# Patient Record
Sex: FEMALE | Race: AMERICAN INDIAN OR ALASKA NATIVE
[De-identification: names, ages, dates, MRNs, and addresses within clinical notes are randomized per-mention and may not be internally consistent; named-entity substitution may affect disease eponyms.]

---

## 2017-06-07 ENCOUNTER — HOSPITAL ENCOUNTER (EMERGENCY)
Dept: HOSPITAL 5 - ED | Age: 57
Discharge: HOME | End: 2017-06-07
Payer: SELF-PAY

## 2017-06-07 VITALS — SYSTOLIC BLOOD PRESSURE: 166 MMHG | DIASTOLIC BLOOD PRESSURE: 92 MMHG

## 2017-06-07 DIAGNOSIS — I10: ICD-10-CM

## 2017-06-07 DIAGNOSIS — M62.838: ICD-10-CM

## 2017-06-07 DIAGNOSIS — M19.012: Primary | ICD-10-CM

## 2017-06-07 DIAGNOSIS — E11.9: ICD-10-CM

## 2017-06-07 PROCEDURE — 93010 ELECTROCARDIOGRAM REPORT: CPT

## 2017-06-07 PROCEDURE — 93005 ELECTROCARDIOGRAM TRACING: CPT

## 2017-06-07 PROCEDURE — 99283 EMERGENCY DEPT VISIT LOW MDM: CPT

## 2017-06-07 NOTE — XRAY REPORT
FINAL REPORT



PROCEDURE:  XR SHOULDER 2 LT



TECHNIQUE:  LEFT shoulder radiographs including AP views in

internal and external rotation and abduction. CPT 90244







HISTORY:  shoulder pain, sweling 



COMPARISON:  No prior studies are available for comparison.



FINDINGS:  

Fracture (s) and/or Dislocation(s): None .



Joint space(s): Mild narrowing of the joint spaces. Slight spur

formation off the acromioclavicular joint.



Soft tissues: Normal .



Bone mineralization: Normal .



Foreign bodies: None .







IMPRESSION:  

No evidence of an acute fracture dislocation. Moderate arthritis.

## 2017-06-07 NOTE — EMERGENCY DEPARTMENT REPORT
Upper Extremity





- HPI


Chief Complaint: Extremity Injury, Upper


Stated Complaint: L SHOULDER/NECK PAIN


Time Seen by Provider: 06/07/17 07:58


Upper Extremity: Left Shoulder


Occurred When: >5 Days (1 week)


Mechanism: Unsure


Severity: severe


Symptoms: Yes Pain with Movement, Yes Swelling (left trapezius muscle swelling)

, No Deformity, No Limited Range of Movement, No Numbness, No Weakness, No 

Bruising/Ecchymosis, No Laceration or Abrasion





ED Review of Systems


ROS: 


Stated complaint: L SHOULDER/NECK PAIN


Other details as noted in HPI





Comment: All other systems reviewed and negative


Constitutional: no symptoms reported.  denies: chills, diaphoresis, fever, 

malaise, weakness


Respiratory: no symptoms reported.  denies: cough, orthopnea, shortness of 

breath, SOB with exertion, SOB at rest, stridor, wheezing


Cardiovascular: denies: chest pain, palpitations, dyspnea on exertion, orthopnea


Endocrine: no symptoms reported


Gastrointestinal: denies: abdominal pain, nausea, vomiting, diarrhea, 

constipation, hematemesis, melena


Genitourinary: denies: urgency, dysuria


Musculoskeletal: arthralgia (left trapezius muscle pain)


Neurological: denies: headache, weakness, numbness, paresthesias, confusion, 

abnormal gait, vertigo





ED Past Medical Hx





- Past Medical History


Previous Medical History?: Yes


Hx Hypertension: Yes


Hx Diabetes: Yes





- Surgical History


Additional Surgical History: cyst removal from neck





- Social History


Smoking Status: Never Smoker





- Medications


Home Medications: 


 Home Medications











 Medication  Instructions  Recorded  Confirmed  Last Taken  Type


 


Ibuprofen [Motrin 800 MG tab] 800 mg PO Q8HR PRN #30 tablet 06/07/17  Unknown Rx


 


Methocarbamol [Robaxin TAB] 750 mg PO Q8H PRN #15 tablet 06/07/17  Unknown Rx














Upper Extremity Exam





- Exam


General: 


Vital signs noted. No distress. Alert and acting appropriately.





Head and Torso: No HEENT Abnormality, No Neck Tenderness, No Chest/Lungs 

Abnormality, No Abdominal Tenderness, No Back Tenderness


Shoulder Exam: Yes Shoulder Tenderness (left shoulder pain with swelling of 

trapezius muscle), Yes Normal Range of Motion in Shoulder, No Clavicle 

Tenderness, No Shoulder Deformity, No AC Joint Tenderness


Arm Exam: No Arm/Humerus Tenderness, No Arm Deformity


Elbow: Yes Normal Range of Motion in Elbow, No Elbow Tenderness, No Elbow 

Deformity


Forearm: No Forearm Tenderness, No Forearm Deformity, No Pain with Pronation, 

No Pain with Supination


Wrist: No Wrist Tenderness, No Normal ROM in Wrist, No Wrist Deformity, No 

Snuffbox Tenderness, No Pain with Axial Thumb Compression


Hand: No Hand Tenderness, No Hand Deformity, No Digit Tenderness, No Normal ROM 

in Digit(s), No Digit(s) Deformity, No Tendon Dysfunction


CMS Exam: Yes Normal Distal Pulses, Yes Normal Capillary Refill, Yes Normal 

Distal Sensation, No Broken Skin





ED Course


 Vital Signs











  06/07/17





  04:26


 


Temperature 98.7 F


 


Pulse Rate 120 H


 


Respiratory 22





Rate 


 


Blood Pressure 161/88


 


O2 Sat by Pulse 97





Oximetry 














ED Medical Decision Making





- Radiology Data


Radiology results: report reviewed





- Medical Decision Making


Patient was no acute distress, patient tenderness with palpation of the left 

shoulder muscles, patient had full range of motion.  Patient no cervical spine 

tenderness with thoracic spine tenderness.  She had no neurological focal 

deficits.


Patient was discharged with prescription for Robaxin 750 mg every 8 hours as 

needed for muscle spasms, she was given a prescription for ibuprofen 800 mg 

every 8 hours as needed for pain.


Patient was given information follow-up with orthopedic specialist.








- Differential Diagnosis


muscle spasms, shoulder strain, osteoarthritis


Critical care attestation.: 


If time is entered above; I have spent that time in minutes in the direct care 

of this critically ill patient, excluding procedure time.








ED Disposition


Clinical Impression: 


 Muscle spasm





Osteoarthritis


Qualifiers:


 Osteoarthritis location: shoulder Osteoarthritis type: unspecified Laterality: 

left Qualified Code(s): M19.012 - Primary osteoarthritis, left shoulder





Disposition: DC-01 TO HOME OR SELFCARE


Is pt being admited?: No


Does the pt Need Aspirin: No


Condition: Good


Instructions:  Muscle Spasm (ED), Osteoarthritis (ED)


Additional Instructions: 


Take Robaxin 750 mg every 8 hours as needed for muscle spasms, take ibuprofen 

800 mg every 8 hours as needed for pain.  Follow-up with provided orthopedic 

specialist for any complications.


Prescriptions: 


Ibuprofen [Motrin 800 MG tab] 800 mg PO Q8HR PRN #30 tablet


 PRN Reason: Pain


Methocarbamol [Robaxin TAB] 750 mg PO Q8H PRN #15 tablet


 PRN Reason: Muscle Spasm


Referrals: 


PRIMARY CARE,MD [Primary Care Provider] - 3-5 Days


Forms:  Work/School Release Form(ED)


Time of Disposition: 08:20

## 2017-10-02 ENCOUNTER — HOSPITAL ENCOUNTER (EMERGENCY)
Dept: HOSPITAL 5 - ED | Age: 57
LOS: 1 days | Discharge: HOME | End: 2017-10-03
Payer: COMMERCIAL

## 2017-10-02 DIAGNOSIS — I10: ICD-10-CM

## 2017-10-02 DIAGNOSIS — L02.12: Primary | ICD-10-CM

## 2017-10-02 DIAGNOSIS — E11.9: ICD-10-CM

## 2017-10-02 PROCEDURE — 99282 EMERGENCY DEPT VISIT SF MDM: CPT

## 2017-10-02 NOTE — EMERGENCY DEPARTMENT REPORT
- General


Chief complaint: Skin/Abscess/Foreign Body


Stated complaint: CYST


Time Seen by Provider: 10/02/17 22:48


Source: patient


Mode of arrival: Ambulatory


Limitations: No Limitations





- History of Present Illness


Initial comments: 





This is a 57-year-old female nontoxic, well nourished in appearance, no acute 

signs of distress presents to the ED complaining of nodular cyst on the back of 

her neck times several days.  Patient stated she had been diagnosed with 

abscess last year had lanced and has recurred several days ago.  Patient denies 

any trauma to the region.  Denies stiff neck, headache, fever, chills, nausea, 

vomiting, cough, numbness, tingling, shortness of breath, abdominal pain.  

Patient denies any allergies. History includes diabetes and hypertension.


MD complaint: abscess/boil


-: Gradual, days(s) (6)


Tetanus Up to Date: yes (2016)


Location: neck


Severity: mild


Severity scale (0 -10): 7


Quality: aching


Consistency: constant


Improves with: none


Worsens with: none


Context: none


Associated symptoms: denies other symptoms


Treatments Prior to Arrival: none





- Related Data


 Previous Rx's











 Medication  Instructions  Recorded  Last Taken  Type


 


Ibuprofen [Motrin 800 MG tab] 800 mg PO Q8HR PRN #30 tablet 06/07/17 Unknown Rx


 


Methocarbamol [Robaxin TAB] 750 mg PO Q8H PRN #15 tablet 06/07/17 Unknown Rx


 


Sulfamethoxazole/Trimethoprim 1 each PO BID #14 tablet 10/02/17 Unknown Rx





[Bactrim DS TAB]    


 


traMADol [Ultram] 50 mg PO Q6HR PRN #12 tablet 10/02/17 Unknown Rx











 Allergies











Allergy/AdvReac Type Severity Reaction Status Date / Time


 


No Known Allergies Allergy   Unverified 05/06/16 12:34














Abscess Boil HPI





- HPI


Chief Complaint: Skin/Abscess/Foreign Body


Stated Complaint: CYST


Time Seen by Provider: 10/02/17 22:48


Home Medications: 


 Previous Rx's











 Medication  Instructions  Recorded  Last Taken  Type


 


Ibuprofen [Motrin 800 MG tab] 800 mg PO Q8HR PRN #30 tablet 06/07/17 Unknown Rx


 


Methocarbamol [Robaxin TAB] 750 mg PO Q8H PRN #15 tablet 06/07/17 Unknown Rx


 


Sulfamethoxazole/Trimethoprim 1 each PO BID #14 tablet 10/02/17 Unknown Rx





[Bactrim DS TAB]    


 


traMADol [Ultram] 50 mg PO Q6HR PRN #12 tablet 10/02/17 Unknown Rx











Allergies/Adverse Reactions: 


 Allergies











Allergy/AdvReac Type Severity Reaction Status Date / Time


 


No Known Allergies Allergy   Unverified 05/06/16 12:34














ED Review of Systems


ROS: 


Stated complaint: CYST


Other details as noted in HPI





Constitutional: denies: chills, fever


Eyes: denies: eye pain, eye discharge, vision change


ENT: denies: ear pain, throat pain


Respiratory: denies: cough, shortness of breath, wheezing


Cardiovascular: denies: chest pain, palpitations


Endocrine: no symptoms reported


Gastrointestinal: denies: abdominal pain, nausea, diarrhea


Genitourinary: denies: urgency, dysuria, discharge


Musculoskeletal: denies: back pain, joint swelling, arthralgia


Skin: denies: rash, lesions


Neurological: denies: headache, weakness, paresthesias


Psychiatric: denies: anxiety, depression


Hematological/Lymphatic: denies: easy bleeding, easy bruising





ED Past Medical Hx





- Past Medical History


Hx Hypertension: Yes


Hx Diabetes: Yes





- Surgical History


Additional Surgical History: cyst removal from neck





- Social History


Smoking Status: Never Smoker


Substance Use Type: None





- Medications


Home Medications: 


 Home Medications











 Medication  Instructions  Recorded  Confirmed  Last Taken  Type


 


Ibuprofen [Motrin 800 MG tab] 800 mg PO Q8HR PRN #30 tablet 06/07/17  Unknown Rx


 


Methocarbamol [Robaxin TAB] 750 mg PO Q8H PRN #15 tablet 06/07/17  Unknown Rx


 


Sulfamethoxazole/Trimethoprim 1 each PO BID #14 tablet 10/02/17  Unknown Rx





[Bactrim DS TAB]     


 


traMADol [Ultram] 50 mg PO Q6HR PRN #12 tablet 10/02/17  Unknown Rx














ED Physical Exam





- General


Limitations: No Limitations


General appearance: alert, in no apparent distress





- Head


Head exam: Present: atraumatic, normocephalic





- Eye


Eye exam: Present: normal appearance, PERRL, EOMI


Pupils: Present: normal accommodation





- ENT


ENT exam: Present: normal exam, normal orophraynx, mucous membranes moist, TM's 

normal bilaterally, normal external ear exam





- Neck


Neck exam: Present: normal inspection, tenderness, full ROM, other (3 cm 

nodular swelling to the posterior neck region. No induration or flutance noted. 

No pus or drianage noted.  No surrounding erythema or cellulitis as noted.).  

Absent: meningismus, lymphadenopathy, thyromegaly





- Respiratory


Respiratory exam: Present: normal lung sounds bilaterally.  Absent: respiratory 

distress, wheezes, rales, rhonchi, stridor, chest wall tenderness, accessory 

muscle use, decreased breath sounds, prolonged expiratory





- Cardiovascular


Cardiovascular Exam: Present: regular rate, normal rhythm, normal heart sounds.

  Absent: systolic murmur, diastolic murmur, rubs, gallop





- GI/Abdominal


GI/Abdominal exam: Present: soft, normal bowel sounds





- Extremities Exam


Extremities exam: Present: normal inspection, full ROM, normal capillary 

refill.  Absent: tenderness, pedal edema, joint swelling, calf tenderness





- Back Exam


Back exam: Present: normal inspection, full ROM.  Absent: tenderness, CVA 

tenderness (R), CVA tenderness (L), muscle spasm, paraspinal tenderness, 

vertebral tenderness, rash noted





- Neurological Exam


Neurological exam: Present: alert, oriented X3, CN II-XII intact, normal gait, 

reflexes normal





- Psychiatric


Psychiatric exam: Present: normal affect, normal mood





- Skin


Skin exam: Present: warm, dry, intact, normal color.  Absent: rash





ED Course


 Vital Signs











  10/02/17





  17:59


 


Temperature 98 F


 


Pulse Rate 100 H


 


Respiratory 20





Rate 


 


Blood Pressure 160/88


 


O2 Sat by Pulse 100





Oximetry 














- Reevaluation(s)


Reevaluation #1: 





10/02/17 23:28


Patient is speaking in full sentences with no signs of distress noted.





ED Medical Decision Making





- Medical Decision Making





57-year-old female that presents with a nodular boil to the posterior neck 

region.  There is no induration or fluctuance noted.  Clinical examination is 

not consistent of abscess.  I instructed patient that this may develop an 

abscess and that if it is getting worse after medical treatment of antibiotics 

she must return to the emergency room to get it lanced.  Patient denies any 

stiff neck or fever.  Patient received Bactrim at discharge. At time time of 

discharge, the patient does not seem toxic or ill in appearance.  No acute 

signs of distress noted.  Patient agrees to discharge treatment plan of care.  

No further questions noted by the patient. Patient was instructed to follow-up 

with a primary care doctor in 3-5 days or if symptoms worsen and continue 

return to emergency room as soon as possible possible.


Critical care attestation.: 


If time is entered above; I have spent that time in minutes in the direct care 

of this critically ill patient, excluding procedure time.








ED Disposition


Clinical Impression: 


 Boil





Disposition: DC-01 TO HOME OR SELFCARE


Is pt being admited?: No


Does the pt Need Aspirin: No


Condition: Stable


Instructions:  Abscess (ED), Sulfamethoxazole/Trimethoprim (By mouth), Tramadol 

(By mouth)


Additional Instructions: 


Patient was instructed to follow-up with a primary care doctor in 3-5 days or 

if symptoms worsen and continue return to emergency room as soon as possible 

possible.


Do not drive any machinery while taking Ultram due to drowsiness.


This may develop into an abscess and if it is getting worse and is increased 

swelling you must return to emergency room to have it lanced.


Prescriptions: 


Sulfamethoxazole/Trimethoprim [Bactrim DS TAB] 1 each PO BID #14 tablet


traMADol [Ultram] 50 mg PO Q6HR PRN #12 tablet


 PRN Reason: Pain


Referrals: 


PRIMARY MD MOSHE [Primary Care Provider] - 3-5 Days


TATA CARRINGTON MD [Staff Physician] - 3-5 Days


[facility] [Outside] - 3-5 Days


[facility] [Outside] - 3-5 Days


Forms:  Work/School Release Form(ED)

## 2017-10-03 VITALS — DIASTOLIC BLOOD PRESSURE: 81 MMHG | SYSTOLIC BLOOD PRESSURE: 175 MMHG

## 2018-01-15 ENCOUNTER — HOSPITAL ENCOUNTER (EMERGENCY)
Dept: HOSPITAL 5 - ED | Age: 58
LOS: 1 days | Discharge: HOME | End: 2018-01-16
Payer: COMMERCIAL

## 2018-01-15 DIAGNOSIS — I10: ICD-10-CM

## 2018-01-15 DIAGNOSIS — E11.9: ICD-10-CM

## 2018-01-15 DIAGNOSIS — M17.12: Primary | ICD-10-CM

## 2018-01-15 LAB
BASOPHILS # (AUTO): 0.1 K/MM3 (ref 0–0.1)
BASOPHILS NFR BLD AUTO: 1 % (ref 0–1.8)
BUN SERPL-MCNC: 12 MG/DL (ref 7–17)
BUN/CREAT SERPL: 24 %
CALCIUM SERPL-MCNC: 9.6 MG/DL (ref 8.4–10.2)
EOSINOPHIL # BLD AUTO: 0.2 K/MM3 (ref 0–0.4)
EOSINOPHIL NFR BLD AUTO: 2.7 % (ref 0–4.3)
HCT VFR BLD CALC: 29.1 % (ref 30.3–42.9)
HEMOLYSIS INDEX: 6
HGB BLD-MCNC: 8.8 GM/DL (ref 10.1–14.3)
LYMPHOCYTES # BLD AUTO: 2.7 K/MM3 (ref 1.2–5.4)
LYMPHOCYTES NFR BLD AUTO: 40.1 % (ref 13.4–35)
MCH RBC QN AUTO: 18 PG (ref 28–32)
MCHC RBC AUTO-ENTMCNC: 30 % (ref 30–34)
MCV RBC AUTO: 58 FL (ref 79–97)
MONOCYTES # (AUTO): 0.4 K/MM3 (ref 0–0.8)
MONOCYTES % (AUTO): 6.2 % (ref 0–7.3)
PLATELET # BLD: 564 K/MM3 (ref 140–440)
RBC # BLD AUTO: 4.99 M/MM3 (ref 3.65–5.03)

## 2018-01-15 PROCEDURE — 80048 BASIC METABOLIC PNL TOTAL CA: CPT

## 2018-01-15 PROCEDURE — 96374 THER/PROPH/DIAG INJ IV PUSH: CPT

## 2018-01-15 PROCEDURE — 81001 URINALYSIS AUTO W/SCOPE: CPT

## 2018-01-15 PROCEDURE — 96372 THER/PROPH/DIAG INJ SC/IM: CPT

## 2018-01-15 PROCEDURE — 36415 COLL VENOUS BLD VENIPUNCTURE: CPT

## 2018-01-15 PROCEDURE — 29505 APPLICATION LONG LEG SPLINT: CPT

## 2018-01-15 PROCEDURE — 73562 X-RAY EXAM OF KNEE 3: CPT

## 2018-01-15 PROCEDURE — 85025 COMPLETE CBC W/AUTO DIFF WBC: CPT

## 2018-01-15 PROCEDURE — 99284 EMERGENCY DEPT VISIT MOD MDM: CPT

## 2018-01-15 NOTE — EMERGENCY DEPARTMENT REPORT
ED Lower Extremity HPI





- General


Chief Complaint: Extremity Injury, Lower


Stated Complaint: LEFT KNEE PAIN


Time Seen by Provider: 01/15/18 18:00


Source: patient


Mode of arrival: Ambulatory


Limitations: No Limitations





- History of Present Illness


Initial Comments: 





This is a 57-year-old female nontoxic, well nourished in appearance, no acute 

signs of distress presents to the ED with c/o of left knee pain and swelling x1 

day. Patient denies any trauma to the region. Patient denies any chest pain, 

shortness of breathe, fever, chills, headache, nausea, vomiting, joint redness, 

stiff neck. Patient denies abnormal gait or decreased gait. Patient stated she 

has been walking more then usual. Patient also is complaining of tiredness and 

weakness x1 month. Patient denies any other symptoms or syncope. Patient denies 

any allergies. Past medical history includes diabetes and HTN. 


MD Complaint: knee injury


-: days(s) (1)


Injury: Knee: Left


Place: home


Severity: mild


Severity scale (0 -10): 8


Improves With: nothing


Worsens With: nothing


Associated Symptoms: swelling, ambulatory.  denies: snap/pop sensation, numbness

, tingling, unable to bear weight, able to partially bear weight





- Related Data


 Previous Rx's











 Medication  Instructions  Recorded  Last Taken  Type


 


Ibuprofen [Motrin 800 MG tab] 800 mg PO Q8HR PRN #30 tablet 06/07/17 Unknown Rx


 


Methocarbamol [Robaxin TAB] 750 mg PO Q8H PRN #15 tablet 06/07/17 Unknown Rx


 


Sulfamethoxazole/Trimethoprim 1 each PO BID #14 tablet 10/02/17 Unknown Rx





[Bactrim DS TAB]    


 


traMADol [Ultram] 50 mg PO Q6HR PRN #12 tablet 10/02/17 Unknown Rx











 Allergies











Allergy/AdvReac Type Severity Reaction Status Date / Time


 


No Known Allergies Allergy   Unverified 05/06/16 12:34














ED Review of Systems


ROS: 


Stated complaint: LEFT KNEE PAIN


Other details as noted in HPI





Constitutional: denies: chills, fever


Eyes: denies: eye pain, eye discharge, vision change


ENT: denies: ear pain, throat pain


Respiratory: denies: cough, shortness of breath, wheezing


Cardiovascular: denies: chest pain, palpitations


Endocrine: no symptoms reported


Gastrointestinal: denies: abdominal pain, nausea, diarrhea


Genitourinary: denies: urgency, dysuria, discharge


Musculoskeletal: denies: back pain, joint swelling, arthralgia


Skin: denies: rash, lesions


Neurological: denies: headache, weakness, paresthesias


Psychiatric: denies: anxiety, depression


Hematological/Lymphatic: denies: easy bleeding, easy bruising





ED Past Medical Hx





- Past Medical History


Previous Medical History?: Yes


Hx Hypertension: Yes


Hx Diabetes: Yes





- Surgical History


Past Surgical History?: Yes


Additional Surgical History: cyst removal from neck





- Social History


Smoking Status: Never Smoker


Substance Use Type: Alcohol





- Medications


Home Medications: 


 Home Medications











 Medication  Instructions  Recorded  Confirmed  Last Taken  Type


 


Ibuprofen [Motrin 800 MG tab] 800 mg PO Q8HR PRN #30 tablet 06/07/17  Unknown Rx


 


Methocarbamol [Robaxin TAB] 750 mg PO Q8H PRN #15 tablet 06/07/17  Unknown Rx


 


Sulfamethoxazole/Trimethoprim 1 each PO BID #14 tablet 10/02/17  Unknown Rx





[Bactrim DS TAB]     


 


traMADol [Ultram] 50 mg PO Q6HR PRN #12 tablet 10/02/17  Unknown Rx














ED Physical Exam





- General


Limitations: No Limitations


General appearance: alert, in no apparent distress





- Head


Head exam: Present: atraumatic, normocephalic, normal inspection





- Eye


Eye exam: Present: normal appearance, PERRL, EOMI.  Absent: scleral icterus, 

conjunctival injection, nystagmus, periorbital swelling, periorbital tenderness


Pupils: Present: normal accommodation





- ENT


ENT exam: Present: normal exam, normal orophraynx, mucous membranes moist, TM's 

normal bilaterally, normal external ear exam





- Neck


Neck exam: Present: normal inspection, full ROM.  Absent: tenderness, 

meningismus, lymphadenopathy, thyromegaly





- Respiratory


Respiratory exam: Present: normal lung sounds bilaterally.  Absent: respiratory 

distress, wheezes, rales, rhonchi, stridor, chest wall tenderness, accessory 

muscle use, decreased breath sounds, prolonged expiratory





- Cardiovascular


Cardiovascular Exam: Present: regular rate, normal rhythm, normal heart sounds.

  Absent: irregular rhythm, systolic murmur, diastolic murmur, rubs, gallop





- GI/Abdominal


GI/Abdominal exam: Present: soft, normal bowel sounds.  Absent: distended, 

tenderness, guarding, rebound, rigid, diminished bowel sounds





- Rectal


Rectal exam: Present: deferred, heme (-) stool.  Absent: heme (+) stool





- Extremities Exam


Extremities exam: Present: normal inspection, full ROM, tenderness, normal 

capillary refill.  Absent: pedal edema, joint swelling, calf tenderness





- Expanded Lower Extremity Exam


  ** Left


Hip exam: Present: normal inspection, full ROM


Upper Leg exam: Present: normal inspection, full ROM


Knee exam: Present: normal inspection, full ROM, tenderness, swelling, full 

knee extension.  Absent: abrasion, laceration, ecchymosis, deformity, crepidus, 

dislocation, erythema, effusion, pain w/ pronation/supination, posterior draw 

sign, pain/laxity with valgus, pain/laxity with varus


Lower Leg exam: Present: normal inspection, full ROM.  Absent: tenderness, 

swelling, abrasion, laceration, ecchymosis, deformity, crepidus, dislocation, 

erythema, palpable cord, Myrna's sign


Ankle exam: Present: normal inspection, full ROM


Foot/Toe exam: Present: normal inspection, full ROM.  Absent: tenderness, 

swelling, abrasion, ecchymosis, deformity, crepidus, dislocation, erythema, 

amputation, puncture wound, foreign body, calcaneal tenderness, tenderness at 

base of 5th metatarsal, nail avulsion, subungual hematoma


Neuro vascular tendon exam: Present: no vascular compromise.  Absent: pulse 

deficit, abnormal cap refill, motor deficit, sensory deficit, tendon deficit, 

extremity cold to touch, pallor, abnormal 2-point discrimination, decreased fine

/light touch, foot drop, peroneal nerve deficit, significant pain with passive 

ROM of distal joint


Gait: Positive: observed and normal





- Back Exam


Back exam: Present: normal inspection, full ROM.  Absent: tenderness, CVA 

tenderness (R), CVA tenderness (L), muscle spasm, paraspinal tenderness, 

vertebral tenderness, rash noted





- Neurological Exam


Neurological exam: Present: alert, oriented X3, CN II-XII intact, normal gait, 

reflexes normal





- Psychiatric


Psychiatric exam: Present: normal affect, normal mood





- Skin


Skin exam: Present: warm, dry, intact, normal color.  Absent: rash





- Other


Other exam information: 





Not warm to touch, no cellultitis or erythema noted of left knee.





ED Course


 Vital Signs











  01/15/18 01/15/18 01/15/18





  12:50 17:51 23:28


 


Temperature 98.5 F 98.8 F 


 


Pulse Rate 97 H 94 H 80


 


Respiratory 16 18 





Rate   


 


Blood Pressure 162/77 163/71 181/87


 


Blood Pressure   





[Right]   


 


O2 Sat by Pulse 99 98 





Oximetry   














  01/16/18 01/16/18





  00:23 00:54


 


Temperature  


 


Pulse Rate 79 


 


Respiratory  





Rate  


 


Blood Pressure 184/89 


 


Blood Pressure  168/94





[Right]  


 


O2 Sat by Pulse  





Oximetry  














- Reevaluation(s)


Reevaluation #1: 





01/15/18 20:14


Patient is speaking in full sentences with no signs of distress noted.


Reevaluation #2: 





01/15/18 20:14


Negative occult heme blood.





- Consultations


Consultation #1: 





01/15/18 20:15


Dr. Campos was consulted about patient history, physical exams, and lab findings 

and agrees for admission for low H/H.


Consultation #2: 





01/15/18 21:20


Dr. Bueno (hospitalist) was consulted about patient history, physical exam, and 

admission and went over the patient history and labs and stated "patient does 

not meet criteria for admission".  


Consultation #3: 





01/15/18 21:45


Patient is taken over by Dr. Campos as she is waiting for Dr. Bueno to assess 

patient.





ED Lower Extremity MDM





- Lab Data


Result diagrams: 


 01/15/18 18:20





 01/15/18 18:20





- Medical Decision Making





This is a 57-year-old female that presents with low H/H and left knee strain. 

Patient is stable and was examined by me. Xray has been obtained and dictated 

by radiologist severe tricompartmental arthritic changes with space narrowing. 

Patient is notified of xray results with no questions noted by the patient. CBC 

with low H/H and high platelet count. Dr. Campos was consulted and agrees for 

admission. Patient was discussed with Dr. Bueno (hospitalist) was consulted 

about patient history, physical exam, and admission and went over the patient 

history and labs and stated "patient does not meet criteria for admission".  As 

per Dr. Campos, Dr. Bueno will assess patient and discharge.





Patient is taken over by Dr. Campos, which Dr. Bueno treated patients blood 

pressure and assessed patient and discharged patient.


Critical care attestation.: 


If time is entered above; I have spent that time in minutes in the direct care 

of this critically ill patient, excluding procedure time.








ED Disposition


Clinical Impression: 


Hypertension


Qualifiers:


 Hypertension type: unspecified Qualified Code(s): I10 - Essential (primary) 

hypertension





Osteoarthritis


Qualifiers:


 Osteoarthritis location: knee Osteoarthritis type: unspecified Laterality: 

left Qualified Code(s): M17.12 - Unilateral primary osteoarthritis, left knee





Disposition: DC-01 TO HOME OR SELFCARE


Is pt being admited?: No


Condition: Stable


Instructions:  Osteoarthritis (ED), Hypertension (ED)


Referrals: 


OZZY ECHOLS MD [Primary Care Provider] - 3-5 Days

## 2018-01-15 NOTE — XRAY REPORT
FINAL REPORT



EXAM:  XRAY KNEE COMPLETE LEFT



HISTORY:  Knee pain 



TECHNIQUE:  Four views left knee



Comparison: None



FINDINGS:  

There is normal bony mineralization.



There is advanced tricompartmental osteoarthritic change with

osteophytic change, medial femoral condylar squaring, tibial

spine blunting, osteophytic change of the medial tibial plateau.



There is no suprapatellar bursal effusion.



There is patellofemoral degenerative disease.



There is medial joint space narrowing. There is no

chondrocalcinosis or fracture.



IMPRESSION:  

Severe tricompartmental osteoarthritic change with medial joint

space narrowing. 



No evidence for OCD lesion. No chondrocalcinosis.



No fracture.



No suprapatellar bursal effusion.

## 2018-01-16 VITALS — DIASTOLIC BLOOD PRESSURE: 94 MMHG | SYSTOLIC BLOOD PRESSURE: 168 MMHG

## 2018-01-16 LAB
BILIRUB UR QL STRIP: (no result)
BLOOD UR QL VISUAL: (no result)
NITRITE UR QL STRIP: (no result)
PH UR STRIP: 6 [PH] (ref 5–7)
RBC #/AREA URNS HPF: 3 /HPF (ref 0–6)
UROBILINOGEN UR-MCNC: 4 MG/DL (ref ?–2)
WBC #/AREA URNS HPF: 3 /HPF (ref 0–6)

## 2018-01-16 NOTE — CONSULTATION
History of Present Illness





- Reason for Consult


Consult date: 01/15/18


generalized weakness and transfusion of packed red blood cells





- History of Present Illness


 57-year-old woman with a history of diabetes Comes the emergency room with 

complaints of left knee pain that started yesterday and she described the pain 

as sharp, intermittent in nature lasting less than 5 minutes, intensity 6/10, 

no radiation and she states that it's worse with activity, better at rest.  She 

denies any generalized weakness, fatigue, palpitation, chest pain, shortness of 

breath.  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


Review Of  Systems:


Constitutional: no weight loss


Ears, eyes, nose, mouth and throat: no nasal congestion, no nasal discharge, no 

sinus pressure, blurry vision, diplopia


Neck: No neck pain or rigidity.


Cardiovascular: No chest pain,  palpitations


Respiratory: No shortness of breath, cough


Gastrointestinal: No abdominal pain, hematochezia


Genitourinary : no dysuria, frequency , hematuria


Musculoskeletal: no muscle ache 


Integumentary: no rash, no pruritis


Neurological: no parathesias, focal weakness


Endocrine: no cold or heat intolerance, no polyuria or polydipsia


Hematologic/Lymphatic: no easy bruising, no easy bleeding, no gland swelling


Allergic/Immunologic: no urticaria, no angioedema.





PAST MEDICAL HISTORY:diabetes





PAST SURGICAL HISTORY: Cyst removal from back





FAMILY HISTORY: Diabetes





SOCIAL HISTORY: Denies alcohol, tobacco, drugs








Medications and Allergies


 Allergies











Allergy/AdvReac Type Severity Reaction Status Date / Time


 


No Known Allergies Allergy   Unverified 05/06/16 12:34











 Home Medications











 Medication  Instructions  Recorded  Confirmed  Last Taken  Type


 


Ibuprofen [Motrin 800 MG tab] 800 mg PO Q8HR PRN #30 tablet 06/07/17  Unknown Rx


 


Methocarbamol [Robaxin TAB] 750 mg PO Q8H PRN #15 tablet 06/07/17  Unknown Rx


 


Sulfamethoxazole/Trimethoprim 1 each PO BID #14 tablet 10/02/17  Unknown Rx





[Bactrim DS TAB]     


 


traMADol [Ultram] 50 mg PO Q6HR PRN #12 tablet 10/02/17  Unknown Rx














Exam





- Physical Exam


Narrative exam: 


Gen. appearance: Patient lying in bed in no acute distress


HEENT: Normocephalic/atraumatic, pupils equal round reactive to light, extra 

occular movement intact, no scleral icterus, no JVD or thyromegaly or nodule, 

neck is supple, mucous membrane moist, no erythema or exudate


Heart: S1-S2, regular rate and rhythm


Lungs: Clear to auscultation bilateral breathing comfortable


Abdomen: Positive bowel sounds, nontender, nondistended, no organomegaly


Extremities: No edema, cyanosis, clubbing.  Left knee: No tenderness, effusion


Neuro:: Oriented 3 , cranial nerves II-12 intact, speech, motor intact


Skin: No rash, nodules, warm dry








- Constitutional


Vitals: 


 











Temp Pulse Resp BP Pulse Ox


 


 98.8 F   80   18   181/87   98 


 


 01/15/18 17:51  01/15/18 23:28  01/15/18 17:51  01/15/18 23:28  01/15/18 17:51














Results





- Labs


CBC & Chem 7: 


 01/15/18 18:20





 01/15/18 18:20


Labs: 


 Abnormal lab results











  01/15/18 01/15/18 01/15/18 Range/Units





  18:20 18:20 23:29 


 


Hgb  8.8 L    (10.1-14.3)  gm/dl


 


Hct  29.1 L    (30.3-42.9)  %


 


MCV  58 L    (79-97)  fl


 


MCH  18 L    (28-32)  pg


 


RDW  19.7 H    (13.2-15.2)  %


 


Plt Count  564 H    (140-440)  K/mm3


 


Lymph % (Auto)  40.1 H    (13.4-35.0)  %


 


Chloride   95.6 L   ()  mmol/L


 


Creatinine   0.5 L   (0.7-1.2)  mg/dL


 


Glucose   148 H   ()  mg/dL


 


U Epithel Cells (Auto)    30.0 H  (0-13.0)  /HPF














Assessment and Plan


X-ray of the knee shows tricompartment severe osteoarthritis


Patient refused chest x-ray which was ordered, states she is fine





Plan


Patient's hemoglobin is 8.8, there is no need for transfusion at this point


In addition she is asymptomatic


Her blood pressure is elevated, given IV hydralazine and oral Lopressor


This has brought her blood pressure down from systolic 190s to 160


She is instructed to follow-up with her primary care physician tomorrow


A prescription for Norvasc 10 mg daily, total of 30 pills was given to her


She is instructed to take her medication and in addition take her blood 

pressure readings every day and keep a log


She'll follow up with her primary care physician for further care of her 

osteoarthritis


She is instructed to return to the emergency room if her condition worsens